# Patient Record
Sex: MALE | Race: BLACK OR AFRICAN AMERICAN | NOT HISPANIC OR LATINO | Employment: UNEMPLOYED | URBAN - METROPOLITAN AREA
[De-identification: names, ages, dates, MRNs, and addresses within clinical notes are randomized per-mention and may not be internally consistent; named-entity substitution may affect disease eponyms.]

---

## 2017-06-11 ENCOUNTER — HOSPITAL ENCOUNTER (EMERGENCY)
Facility: HOSPITAL | Age: 22
Discharge: HOME | End: 2017-06-11
Attending: EMERGENCY MEDICINE
Payer: MEDICAID

## 2017-06-11 VITALS
HEART RATE: 91 BPM | RESPIRATION RATE: 17 BRPM | SYSTOLIC BLOOD PRESSURE: 138 MMHG | WEIGHT: 188 LBS | DIASTOLIC BLOOD PRESSURE: 77 MMHG | TEMPERATURE: 99 F | OXYGEN SATURATION: 99 % | BODY MASS INDEX: 29.51 KG/M2 | HEIGHT: 67 IN

## 2017-06-11 DIAGNOSIS — M54.9 BACK PAIN, UNSPECIFIED BACK LOCATION, UNSPECIFIED BACK PAIN LATERALITY, UNSPECIFIED CHRONICITY: Primary | ICD-10-CM

## 2017-06-11 PROCEDURE — 99283 EMERGENCY DEPT VISIT LOW MDM: CPT | Mod: 25

## 2017-06-11 PROCEDURE — 96372 THER/PROPH/DIAG INJ SC/IM: CPT

## 2017-06-11 PROCEDURE — 63600175 PHARM REV CODE 636 W HCPCS: Performed by: EMERGENCY MEDICINE

## 2017-06-11 RX ORDER — NAPROXEN 500 MG/1
500 TABLET ORAL 2 TIMES DAILY WITH MEALS
Qty: 40 TABLET | Refills: 0 | Status: SHIPPED | OUTPATIENT
Start: 2017-06-11

## 2017-06-11 RX ORDER — BETAMETHASONE SODIUM PHOSPHATE AND BETAMETHASONE ACETATE 3; 3 MG/ML; MG/ML
12 INJECTION, SUSPENSION INTRA-ARTICULAR; INTRALESIONAL; INTRAMUSCULAR; SOFT TISSUE
Status: COMPLETED | OUTPATIENT
Start: 2017-06-11 | End: 2017-06-11

## 2017-06-11 RX ORDER — METHOCARBAMOL 500 MG/1
1000 TABLET, FILM COATED ORAL 3 TIMES DAILY PRN
Qty: 30 TABLET | Refills: 0 | Status: SHIPPED | OUTPATIENT
Start: 2017-06-11 | End: 2017-06-16

## 2017-06-11 RX ADMIN — BETAMETHASONE SODIUM PHOSPHATE AND BETAMETHASONE ACETATE 12 MG: 3; 3 INJECTION, SUSPENSION INTRA-ARTICULAR; INTRALESIONAL; INTRAMUSCULAR at 09:06

## 2017-06-12 NOTE — DISCHARGE INSTRUCTIONS
Please follow-up with your PCP this week.  Medications as prescribed.  Return for new or worsening symptoms such as fever, lower extremity weakness, or bowel or bladder dysfunction.

## 2017-06-12 NOTE — ED PROVIDER NOTES
Encounter Date: 6/11/2017       History     Chief Complaint   Patient presents with    Back Pain     Generalized back pain x 3 months, denies any trauma, fall or any urinary symptoms.  HX ADHD     Review of patient's allergies indicates:  No Known Allergies  Chief complaint: Back pain    History presents: 21-year-old male with no medical history other than ADHD who presents the emergency department for evaluation of several months of bilateral parathoracic and paralumbar back pain.  Patient denies injury.  He denies any bowel or bladder dysfunction, fevers, or lower extremity weakness.  Patient states he works at [employer] and is lifting objects during the day.  He is also on his feet mostly during the day.he denies any history of HIV, IV drug use, or cancer.        The history is provided by the patient. No  was used.     Past Medical History:   Diagnosis Date    ADHD (attention deficit hyperactivity disorder)      History reviewed. No pertinent surgical history.  History reviewed. No pertinent family history.  Social History   Substance Use Topics    Smoking status: Current Every Day Smoker     Packs/day: 1.00     Types: Cigarettes    Smokeless tobacco: Not on file    Alcohol use Yes      Comment: occasionally     Review of Systems   Constitutional: Negative for fever.   HENT: Negative for sore throat.    Respiratory: Negative for shortness of breath.    Cardiovascular: Negative for chest pain.   Gastrointestinal: Negative for nausea.   Genitourinary: Negative for dysuria.   Musculoskeletal: Positive for back pain.   Skin: Negative for rash.   Neurological: Negative for weakness.   Hematological: Does not bruise/bleed easily.       Physical Exam     Initial Vitals [06/11/17 2037]   BP Pulse Resp Temp SpO2   138/77 91 17 98.5 °F (36.9 °C) 99 %     Physical Exam    Nursing note and vitals reviewed.  Constitutional: He appears well-developed and well-nourished. He is not diaphoretic. No  distress.   Ambulatory, well-appearing patient in no acute distress.   HENT:   Head: Normocephalic and atraumatic.   Right Ear: External ear normal.   Left Ear: External ear normal.   Nose: Nose normal.   Mouth/Throat: Oropharynx is clear and moist.   Eyes: Conjunctivae and EOM are normal. Pupils are equal, round, and reactive to light. Right eye exhibits no discharge. Left eye exhibits no discharge. No scleral icterus.   Neck: Normal range of motion. Neck supple. No JVD present.   Cardiovascular: Normal rate, regular rhythm, normal heart sounds and intact distal pulses. Exam reveals no gallop and no friction rub.    No murmur heard.  Pulmonary/Chest: No stridor.   Musculoskeletal: Normal range of motion. He exhibits no edema or tenderness.   No point tenderness or midline tenderness or step-offs or deformities noted in the vertebrae.   Neurological: He is alert and oriented to person, place, and time. He has normal strength. No cranial nerve deficit or sensory deficit.   Strength and sensation equal and intact in bilateral lower extremities in all dermatomes and muscle groups.   Skin: Skin is warm and dry. No rash noted. No erythema. No pallor.   Psychiatric: He has a normal mood and affect. His behavior is normal. Judgment and thought content normal.         ED Course   Procedures  Labs Reviewed - No data to display          Medical Decision Making:   ED Management:  This is the emergent evaluation of a 21-year-old male presents the emergency department for evaluation of low back pain and mid back pain bilaterally for several months.Differential diagnosis at the time of initial evaluation included, but was not limited to: Muscle strain, sprain, compression fracture, radiculopathy.  Patient does not have any radiation of pain down his legs.  I doubt radiculopathy.  Additionally, no axial loading injury or falls.  I doubt bony underbelly.  I doubt spinal cord compression is a patient has no lower extremity weakness  by history or exam, bowel or bladder dysfunction, fevers, or any history of HIV/IV drug use/cancer.  Most likely muscular pain related to the patient's work.  The patient is on his feet during the day and lifting objects where he works at [employer].  I will prescribe NSAIDs and muscle relaxant for now.  I've advised the patient to follow-up with his PCP and return for new or worsening symptoms such as fever, lower extremity weakness, or bowel or bladder dysfunction.  I will also give the patient an IM injection of Celestone.                   ED Course     Clinical Impression:   Back strain          Salvador Singleton Jr., MD  06/11/17 6774

## 2018-10-20 ENCOUNTER — HOSPITAL ENCOUNTER (EMERGENCY)
Facility: HOSPITAL | Age: 23
Discharge: HOME | End: 2018-10-20
Attending: EMERGENCY MEDICINE
Payer: MEDICAID

## 2018-10-20 VITALS
RESPIRATION RATE: 18 BRPM | SYSTOLIC BLOOD PRESSURE: 143 MMHG | BODY MASS INDEX: 26.52 KG/M2 | OXYGEN SATURATION: 100 % | WEIGHT: 175 LBS | TEMPERATURE: 98 F | DIASTOLIC BLOOD PRESSURE: 83 MMHG | HEART RATE: 82 BPM | HEIGHT: 68 IN
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
## 2018-10-20 PROCEDURE — 99282 EMERGENCY DEPT VISIT SF MDM: CPT

## 2018-10-20 NOTE — ED TRIAGE NOTES
Patient presents to the ED via personal transportation alone. Patient reports that he put a ringer on his right 4th digit last night and it got stuck. Denies numbness, tingling.

## 2018-10-20 NOTE — ED PROVIDER NOTES
Encounter Date: 10/20/2018       History     Chief Complaint   Patient presents with    Ring removal     Pt. arrives to ED for removal of ring on right ring finger, reports placing it there last night and unable to remove it. Denies any pain     CC: Ring Removal     HPI:   22 year old male with no pertinent past medical history presenting for ring removal. Pt placed his grandmother's stainless steel ring on R 3rd digit yesterday and has been unable to remove it due to swelling. Denies fever, redness, weakness, numbness, tingling. Attempted removal with lotion at home.           Review of patient's allergies indicates:  No Known Allergies  Past Medical History:   Diagnosis Date    ADHD (attention deficit hyperactivity disorder)      History reviewed. No pertinent surgical history.  History reviewed. No pertinent family history.  Social History     Tobacco Use    Smoking status: Current Every Day Smoker     Packs/day: 1.00     Types: Cigarettes   Substance Use Topics    Alcohol use: No     Frequency: Never     Comment: occasionally    Drug use: No     Review of Systems   Constitutional: Negative for chills and fever.   Gastrointestinal: Negative for nausea and vomiting.   Musculoskeletal: Negative for back pain and neck pain.   Skin: Negative for rash and wound.        (+) R 3rd digit swelling         Physical Exam     Initial Vitals [10/20/18 1109]   BP Pulse Resp Temp SpO2   (!) 143/83 82 18 98.4 °F (36.9 °C) 100 %      MAP       --         Physical Exam    Nursing note and vitals reviewed.  Constitutional: He appears well-developed and well-nourished. No distress.   HENT:   Head: Normocephalic.   Right Ear: External ear normal.   Left Ear: External ear normal.   Eyes: Conjunctivae are normal.   Cardiovascular: Normal rate and regular rhythm. Exam reveals no gallop and no friction rub.    No murmur heard.  Pulmonary/Chest: Breath sounds normal. No respiratory distress.   Musculoskeletal: Normal range of motion.    Neurological: He is alert. No sensory deficit.   Skin: Skin is warm and dry. No erythema.   Ring to R 3rd digit with swelling of the distal phalanx     Psychiatric: He has a normal mood and affect.         ED Course   Foreign Body  Date/Time: 10/20/2018 3:24 PM  Performed by: Tara Ospina PA-C  Authorized by: Kaiser Broussard MD   Body area: skin  Removal mechanism: string, manual ring cutter, electric ring cutter.  Tendon involvement: none  Complexity: simple  1 objects recovered.  Post-procedure assessment: foreign body removed  Patient tolerance: Patient tolerated the procedure well with no immediate complications      Labs Reviewed - No data to display       Imaging Results    None          Medical Decision Making:   Initial Assessment:   22-year-old presenting for ring removal. Exam above. Ring removed per procedure note. FROM of joints of R 3rd digit after ring removal. No neurovascular deficits. PCP follow up in 2 days. Return to ER for worsening symptoms or as needed. Discsused with Dr. Newby who also evaluate pt face to face and he agrees with assessment and plan.                       Clinical Impression:   The encounter diagnosis was Ring or other jewelry causing external constriction, initial encounter.                          Tara Ospina PA-C  10/20/18 1531

## 2021-06-22 ENCOUNTER — LAB VISIT (OUTPATIENT)
Dept: PRIMARY CARE CLINIC | Facility: OTHER | Age: 26
End: 2021-06-22
Payer: MEDICAID

## 2021-06-22 DIAGNOSIS — Z20.822 ENCOUNTER FOR LABORATORY TESTING FOR COVID-19 VIRUS: ICD-10-CM

## 2021-06-22 PROCEDURE — U0003 INFECTIOUS AGENT DETECTION BY NUCLEIC ACID (DNA OR RNA); SEVERE ACUTE RESPIRATORY SYNDROME CORONAVIRUS 2 (SARS-COV-2) (CORONAVIRUS DISEASE [COVID-19]), AMPLIFIED PROBE TECHNIQUE, MAKING USE OF HIGH THROUGHPUT TECHNOLOGIES AS DESCRIBED BY CMS-2020-01-R: HCPCS | Performed by: INTERNAL MEDICINE

## 2021-06-23 LAB — SARS-COV-2 RNA RESP QL NAA+PROBE: NOT DETECTED

## 2021-08-29 NOTE — DISCHARGE INSTRUCTIONS
Follow up with primary care in 2 days. Return to ER for worsening symptoms or as needed.    PT RECOVERED. MOTHER BEDSIDE. NAD.